# Patient Record
Sex: FEMALE | Race: WHITE | NOT HISPANIC OR LATINO | Employment: FULL TIME | URBAN - METROPOLITAN AREA
[De-identification: names, ages, dates, MRNs, and addresses within clinical notes are randomized per-mention and may not be internally consistent; named-entity substitution may affect disease eponyms.]

---

## 2022-07-26 ENCOUNTER — HOSPITAL ENCOUNTER (EMERGENCY)
Facility: CLINIC | Age: 42
Discharge: HOME | End: 2022-07-26
Attending: EMERGENCY MEDICINE | Admitting: EMERGENCY MEDICINE
Payer: COMMERCIAL

## 2022-07-26 VITALS
WEIGHT: 186 LBS | BODY MASS INDEX: 32.96 KG/M2 | OXYGEN SATURATION: 96 % | DIASTOLIC BLOOD PRESSURE: 86 MMHG | RESPIRATION RATE: 16 BRPM | HEIGHT: 63 IN | TEMPERATURE: 97.9 F | SYSTOLIC BLOOD PRESSURE: 141 MMHG | HEART RATE: 77 BPM

## 2022-07-26 DIAGNOSIS — H92.02 LEFT EAR PAIN: ICD-10-CM

## 2022-07-26 PROCEDURE — 99283 EMERGENCY DEPT VISIT LOW MDM: CPT | Performed by: EMERGENCY MEDICINE

## 2022-07-26 PROCEDURE — 250N000013 HC RX MED GY IP 250 OP 250 PS 637: Performed by: EMERGENCY MEDICINE

## 2022-07-26 PROCEDURE — 99284 EMERGENCY DEPT VISIT MOD MDM: CPT | Performed by: EMERGENCY MEDICINE

## 2022-07-26 RX ORDER — AZITHROMYCIN 250 MG/1
TABLET, FILM COATED ORAL
Qty: 6 TABLET | Refills: 0 | Status: SHIPPED | OUTPATIENT
Start: 2022-07-26 | End: 2022-07-31

## 2022-07-26 RX ORDER — ACETAMINOPHEN 500 MG
1000 TABLET ORAL ONCE
Status: COMPLETED | OUTPATIENT
Start: 2022-07-26 | End: 2022-07-26

## 2022-07-26 RX ADMIN — ACETAMINOPHEN 1000 MG: 500 TABLET ORAL at 05:33

## 2022-07-26 ASSESSMENT — ENCOUNTER SYMPTOMS
WEAKNESS: 0
DYSURIA: 0
SHORTNESS OF BREATH: 0
ABDOMINAL PAIN: 0
BACK PAIN: 0
FACIAL SWELLING: 0
VOMITING: 0
BRUISES/BLEEDS EASILY: 0
CONFUSION: 0
NAUSEA: 0
FEVER: 0

## 2022-07-26 NOTE — ED TRIAGE NOTES
Started having right ear pain yesterday at 7pm, the pain has now moved to her left ear, no right ear pain at this time, no drainage, denies trauma     Triage Assessment     Row Name 07/26/22 0454       Triage Assessment (Adult)    Airway WDL WDL       Respiratory WDL    Respiratory WDL WDL       Skin Circulation/Temperature WDL    Skin Circulation/Temperature WDL WDL       Cardiac WDL    Cardiac WDL WDL       Peripheral/Neurovascular WDL    Peripheral Neurovascular WDL WDL       Cognitive/Neuro/Behavioral WDL    Cognitive/Neuro/Behavioral WDL WDL

## 2022-07-26 NOTE — ED PROVIDER NOTES
ED Provider Note  [facility]      History     Chief Complaint   Patient presents with     Otalgia     Left ear pain     HPI  Vangie Jensen is a 42 year old female who has no significant past medical history who presents to the emergency department for evaluation of ear pain.  Patient states that last night around 7 PM she had some discomfort in her right ear and thought she might be starting to get an earache.  Patient states that around 10 PM last night she had severe pain in her left ear.  Patient describes the pain as a constant throbbing pressure-like pain in her left ear.  No radiation of the pain, no aggravating factors.  Patient states she did take ibuprofen with some improvement of her symptoms.  Patient denies any fever, chills.  Patient states that she has had some mild upper respiratory symptoms for the past day and does note she will occasionally gets a flareup of her seasonal allergies depending in the day.  No other complaints.  Patient states that she has had ear infections in the past but has been many years.    Past Medical History  Past Medical History:   Diagnosis Date     NO ACTIVE PROBLEMS      Past Surgical History:   Procedure Laterality Date     DENTAL SURGERY  2002    wisdom teeth, no complications     azithromycin (ZITHROMAX Z-RAFAEL) 250 MG tablet  drospirenone-ethinyl estradiol (ENDY) 3-0.03 MG per tablet  Misc Natural Products (GINSENG XTRA) CAPS  Multiple Vitamins-Minerals (ADEKS) chewable tablet      Allergies   Allergen Reactions     Penicillins Hives     Family History  Family History   Problem Relation Age of Onset     Asthma Son      Neurologic Disorder Maternal Aunt         migraines     Cerebrovascular Disease Maternal Aunt      Diabetes No family hx of      Social History   Social History     Tobacco Use     Smoking status: Never Smoker     Smokeless tobacco: Never Used   Substance Use Topics     Alcohol use: No     Drug use: No      Past medical  "history, past surgical history, medications, allergies, family history, and social history were reviewed with the patient. No additional pertinent items.       Review of Systems   Constitutional: Negative for fever.   HENT: Positive for ear pain. Negative for ear discharge and facial swelling.    Eyes: Negative for visual disturbance.   Respiratory: Negative for shortness of breath.    Cardiovascular: Negative for chest pain.   Gastrointestinal: Negative for abdominal pain, nausea and vomiting.   Endocrine: Negative for polyuria.   Genitourinary: Negative for dysuria.   Musculoskeletal: Negative for back pain.   Skin: Negative for rash.   Allergic/Immunologic: Negative for immunocompromised state.   Neurological: Negative for weakness.   Hematological: Does not bruise/bleed easily.   Psychiatric/Behavioral: Negative for confusion.     A complete review of systems was performed with pertinent positives and negatives noted in the HPI, and all other systems negative.    Physical Exam   BP: (!) 141/86  Pulse: 77  Temp: 97.9  F (36.6  C)  Resp: 16  Height: 160 cm (5' 3\")  Weight: 84.4 kg (186 lb)  SpO2: 96 %  Physical Exam  General: Afebrile, no acute distress   HEENT: Normocephalic, atraumatic, conjunctivae normal, Right TM normal, Left TM with increased vascularity, erythema, decrease light reflex, no drainage. MMM  Neck: non-tender, supple  Cardio: regular rate. regular rhythm   Resp: Normal work of breathing, no respiratory distress, lungs clear bilaterally, no wheezing, rhonchi, rales  Chest/Back: no visual signs of trauma, no CVA tenderness   Abdomen: soft, non distension, no tenderness, no peritoneal signs   Neuro: alert and fully oriented. CN II-XII grossly intact. Grossly normal strength and sensation in all extremities.   MSK: no deformities. Normal range of motion  Integumentary/Skin: no rash visualized, normal color  Psych: normal affect, normal behavior    ED Course      Procedures  No results found for any " visits on 07/26/22.  Medications   acetaminophen (TYLENOL) tablet 1,000 mg (has no administration in time range)        Assessments & Plan (with Medical Decision Making)   Vangie Jensen is a 42 year old female who has no significant past medical history who presents to the emergency department for evaluation of ear pain.  On arrival patient is well-appearing, afebrile, no distress.  Patient does report improvement in her symptoms after ibuprofen prior to arrival.  Patient now with left ear pain, fullness, on examination there is some increased vascularity, erythema, and decreased light reflex.  No drainage.  Patient is afebrile, nontoxic-appearing.  I discussed with patient could be early infection.  At this time recommend continue supportive care with Tylenol and ibuprofen to help with her pain.  We will send patient home with a course of antibiotics if no improvement/worsening of her symptoms, high fever.  Patient understands and agrees with plan.  Controlled outpatient follow-up and return precautions.    I have reviewed the nursing notes. I have reviewed the findings, diagnosis, plan and need for follow up with the patient.    New Prescriptions    AZITHROMYCIN (ZITHROMAX Z-RAFALE) 250 MG TABLET    Two tablets on the first day, then one tablet daily for the next 4 days       Final diagnoses:   Left ear pain       --  Padmaja Naranjo MD  [facility] EMERGENCY DEPARTMENT  7/26/2022     Padmaja Naranjo MD  07/26/22 0591

## 2022-07-26 NOTE — DISCHARGE INSTRUCTIONS
Thank you for your patience today.  Please follow-up with your regular doctor in the next 2-3 days for further evaluation and follow-up care.  Please call to schedule an appointment.  Please continue your own medications.  Please take ibuprofen 600 mg every 6 hours as needed for fever, pain as well as Tylenol 1000 mg every 6 hours as needed for fever, pain.  You may alternate these medications so you take something every 3 hours.  If no improvement of your symptoms, continued pain, high fever, please start antibiotics.  Please return to the ER if you develop any worsening of your current symptoms.  It was a pleasure taking care of you today.  We hope you feel better soon.